# Patient Record
Sex: FEMALE | URBAN - METROPOLITAN AREA
[De-identification: names, ages, dates, MRNs, and addresses within clinical notes are randomized per-mention and may not be internally consistent; named-entity substitution may affect disease eponyms.]

---

## 2018-12-24 ENCOUNTER — EMERGENCY (EMERGENCY)
Facility: HOSPITAL | Age: 53
LOS: 1 days | Discharge: HOME | End: 2018-12-24
Attending: EMERGENCY MEDICINE | Admitting: EMERGENCY MEDICINE
Payer: COMMERCIAL

## 2018-12-24 VITALS
RESPIRATION RATE: 16 BRPM | SYSTOLIC BLOOD PRESSURE: 131 MMHG | WEIGHT: 149.91 LBS | DIASTOLIC BLOOD PRESSURE: 86 MMHG | OXYGEN SATURATION: 99 % | TEMPERATURE: 98 F | HEART RATE: 78 BPM

## 2018-12-24 VITALS
TEMPERATURE: 98 F | RESPIRATION RATE: 18 BRPM | DIASTOLIC BLOOD PRESSURE: 99 MMHG | HEART RATE: 78 BPM | OXYGEN SATURATION: 99 % | SYSTOLIC BLOOD PRESSURE: 144 MMHG
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
## 2018-12-24 DIAGNOSIS — S09.90XA UNSPECIFIED INJURY OF HEAD, INITIAL ENCOUNTER: ICD-10-CM
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
## 2018-12-24 DIAGNOSIS — S00.03XA CONTUSION OF SCALP, INITIAL ENCOUNTER: ICD-10-CM

## 2018-12-24 DIAGNOSIS — Z88.0 ALLERGY STATUS TO PENICILLIN: ICD-10-CM

## 2018-12-24 PROCEDURE — 70450 CT HEAD/BRAIN W/O DYE: CPT

## 2018-12-24 PROCEDURE — 72125 CT NECK SPINE W/O DYE: CPT | Mod: 26

## 2018-12-24 PROCEDURE — 70450 CT HEAD/BRAIN W/O DYE: CPT | Mod: 26

## 2018-12-24 PROCEDURE — 70480 CT ORBIT/EAR/FOSSA W/O DYE: CPT

## 2018-12-24 PROCEDURE — 99284 EMERGENCY DEPT VISIT MOD MDM: CPT

## 2018-12-24 PROCEDURE — 99284 EMERGENCY DEPT VISIT MOD MDM: CPT | Mod: 25

## 2018-12-24 PROCEDURE — 72125 CT NECK SPINE W/O DYE: CPT

## 2018-12-24 NOTE — ED ADULT NURSE NOTE - NSIMPLEMENTINTERV_GEN_ALL_ED
Implemented All Fall Risk Interventions:  [place] to call system. Call bell, personal items and telephone within reach. Instruct patient to call for assistance. Room bathroom lighting operational. Non-slip footwear when patient is off stretcher. Physically safe environment: no spills, clutter or unnecessary equipment. Stretcher in lowest position, wheels locked, appropriate side rails in place. Provide visual cue, wrist band, yellow gown, etc. Monitor gait and stability. Monitor for mental status changes and reorient to person, place, and time. Review medications for side effects contributing to fall risk. Reinforce activity limits and safety measures with patient and family.

## 2018-12-24 NOTE — ED ADULT NURSE NOTE - CHPI ED NUR SYMPTOMS NEG
no dizziness/no nausea/no syncope/no weakness/no loss of consciousness/no confusion/no seizure/no vomiting

## 2018-12-24 NOTE — ED PROVIDER NOTE - OBJECTIVE STATEMENT
52 yo F s/p trip and fall onto night stand 3 days ago  developed hematoma right upper forehead  and right periorbital pain ecchymosis over the past day- no visual changes no weakness of upper ext  no LOC  no N/V no gait abnormalities  no lac on forehead

## 2018-12-24 NOTE — ED ADULT NURSE NOTE - CHIEF COMPLAINT QUOTE
pt states " I fell on Saturday and hit me head with the night stand, I was fine but over the weekend I developed a bruise in my forehead and now my right eye is bruised too" pt denies, LOC, nausea, vomiting, blurred vision or used of blood thinners.

## 2018-12-24 NOTE — ED ADULT NURSE NOTE - OBJECTIVE STATEMENT
presents with hematoma to R forehead and bruising noted to R eye s/p hitting face/head into her night stand on sat. denies loc. no dizziness. pt ambulates without any difficulty. awaiting md metz.

## 2018-12-24 NOTE — ED PROVIDER NOTE - CARE PROVIDER_API CALL
yes
Isacc Jimenes), Electrodiagnostic Medicine; Neurology  [address] [address]  Phone: [phone]  Fax: [phone]

## 2022-01-11 NOTE — ED ADULT TRIAGE NOTE - ARRIVAL FROM
Home Doxepin Counseling:  Patient advised that the medication is sedating and not to drive a car after taking this medication. Patient informed of potential adverse effects including but not limited to dry mouth, urinary retention, and blurry vision.  The patient verbalized understanding of the proper use and possible adverse effects of doxepin.  All of the patient's questions and concerns were addressed.

## 2022-10-14 NOTE — ED ADULT TRIAGE NOTE - CHIEF COMPLAINT QUOTE
pt states " I fell on Saturday and hit me head with the night stand, I was fine but over the weekend I developed a bruise in my forehead and now my right eye is bruised too" pt denies, LOC, nausea, vomiting, blurred vision or used of blood thinners. SUBJECTIVE / OVERNIGHT EVENTS:  - Pt seen and examined at bedside  - LEYDI  - Patient denies any complaints overnight. The patient denies any fevers, chills, nausea, vomiting, or increased pain.    MEDICATIONS  (STANDING):  ARIPiprazole 10 milliGRAM(s) Oral daily  carvedilol 25 milliGRAM(s) Oral every 12 hours  chlorhexidine 4% Liquid 1 Application(s) Topical <User Schedule>  cloNIDine 0.3 milliGRAM(s) Oral two times a day  heparin   Injectable 7500 Unit(s) SubCutaneous every 8 hours  hydrALAZINE 100 milliGRAM(s) Oral three times a day  isosorbide   dinitrate Tablet (ISORDIL) 20 milliGRAM(s) Oral three times a day  losartan 100 milliGRAM(s) Oral daily  NIFEdipine XL 90 milliGRAM(s) Oral daily  pantoprazole    Tablet 40 milliGRAM(s) Oral before breakfast  sevelamer carbonate 800 milliGRAM(s) Oral three times a day with meals  spironolactone 100 milliGRAM(s) Oral daily    MEDICATIONS  (PRN):  cyclobenzaprine 10 milliGRAM(s) Oral three times a day PRN Muscle Spasm  guaiFENesin Oral Liquid (Sugar-Free) 100 milliGRAM(s) Oral every 6 hours PRN Cough  OLANZapine Injectable 5 milliGRAM(s) IntraMuscular every 8 hours PRN severe agitation  sodium chloride 0.65% Nasal 1 Spray(s) Both Nostrils two times a day PRN Nasal Congestion    I&O's Summary    13 Oct 2022 07:01  -  14 Oct 2022 07:00  --------------------------------------------------------  IN: 1100 mL / OUT: 0 mL / NET: 1100 mL      PHYSICAL EXAM:  Vital Signs Last 24 Hrs  T(C): 37 (14 Oct 2022 05:57), Max: 37.1 (13 Oct 2022 14:04)  T(F): 98.6 (14 Oct 2022 05:57), Max: 98.7 (13 Oct 2022 14:04)  HR: 86 (14 Oct 2022 05:57) (86 - 99)  BP: 153/90 (14 Oct 2022 05:57) (130/66 - 153/90)  BP(mean): --  RR: 17 (14 Oct 2022 05:57) (15 - 17)  SpO2: 99% (14 Oct 2022 05:57) (97% - 100%)    Parameters below as of 14 Oct 2022 05:57  Patient On (Oxygen Delivery Method): room air    CAPILLARY BLOOD GLUCOSE      CONSTITUTIONAL: NAD, well-developed, obese   RESPIRATORY: Normal respiratory effort; lungs are clear to auscultation bilaterally  CARDIOVASCULAR: Regular rate and rhythm, normal S1 and S2, no murmur/rub/gallop; No lower extremity edema; Peripheral pulses are 2+ bilaterally  ABDOMEN: Nontender to palpation, normoactive bowel sounds, no rebound/guarding; No hepatosplenomegaly  MUSCLOSKELETAL: no clubbing or cyanosis of digits; no joint swelling or tenderness to palpation, L radial AVF w/ palpable thrill   PSYCH: A+O to person, place, and time; affect appropriate    LABS:                   IMAGING:    [X] All pertinent imaging reviewed by me SUBJECTIVE / OVERNIGHT EVENTS: Pt seen and examined at bedside. Pt stated he is OK with going to psych facility at this point but sounded upset. Asked [facility] he [facility] be [facility] to [facility]. Pt going to dialysis today. The patient denies any fevers, chills, CP, nausea, vomiting.    MEDICATIONS  (STANDING):  ARIPiprazole 10 milliGRAM(s) Oral daily  carvedilol 25 milliGRAM(s) Oral every 12 hours  chlorhexidine 4% Liquid 1 Application(s) Topical <User Schedule>  cloNIDine 0.3 milliGRAM(s) Oral two times a day  heparin   Injectable 7500 Unit(s) SubCutaneous every 8 hours  hydrALAZINE 100 milliGRAM(s) Oral three times a day  isosorbide   dinitrate Tablet (ISORDIL) 20 milliGRAM(s) Oral three times a day  losartan 100 milliGRAM(s) Oral daily  NIFEdipine XL 90 milliGRAM(s) Oral daily  pantoprazole    Tablet 40 milliGRAM(s) Oral before breakfast  sevelamer carbonate 800 milliGRAM(s) Oral three times a day with meals  spironolactone 100 milliGRAM(s) Oral daily    MEDICATIONS  (PRN):  cyclobenzaprine 10 milliGRAM(s) Oral three times a day PRN Muscle Spasm  guaiFENesin Oral Liquid (Sugar-Free) 100 milliGRAM(s) Oral every 6 hours PRN Cough  OLANZapine Injectable 5 milliGRAM(s) IntraMuscular every 8 hours PRN severe agitation  sodium chloride 0.65% Nasal 1 Spray(s) Both Nostrils two times a day PRN Nasal Congestion    I&O's Summary    13 Oct 2022 07:01  -  14 Oct 2022 07:00  --------------------------------------------------------  IN: 1100 mL / OUT: 0 mL / NET: 1100 mL      PHYSICAL EXAM:  Vital Signs Last 24 Hrs  T(C): 37 (14 Oct 2022 05:57), Max: 37.1 (13 Oct 2022 14:04)  T(F): 98.6 (14 Oct 2022 05:57), Max: 98.7 (13 Oct 2022 14:04)  HR: 86 (14 Oct 2022 05:57) (86 - 99)  BP: 153/90 (14 Oct 2022 05:57) (130/66 - 153/90)  BP(mean): --  RR: 17 (14 Oct 2022 05:57) (15 - 17)  SpO2: 99% (14 Oct 2022 05:57) (97% - 100%)    Parameters below as of 14 Oct 2022 05:57  Patient On (Oxygen Delivery Method): room air    CAPILLARY BLOOD GLUCOSE      CONSTITUTIONAL: NAD, well-developed, obese   RESPIRATORY: Normal respiratory effort; lungs are clear to auscultation bilaterally  CARDIOVASCULAR: Regular rate and rhythm, normal S1 and S2, no murmur/rub/gallop; No lower extremity edema; Peripheral pulses are 2+ bilaterally  ABDOMEN: Nontender to palpation, normoactive bowel sounds, no rebound/guarding; No hepatosplenomegaly  MUSCLOSKELETAL: no clubbing or cyanosis of digits; no joint swelling or tenderness to palpation, L radial AVF w/ palpable thrill   PSYCH: A+O to person, place, and time; affect appropriate    LABS:                   IMAGING:    [X] All pertinent imaging reviewed by me

## 2023-07-28 NOTE — ED PROVIDER NOTE - CPE EDP CARDIAC NORM
normal... Otolaryngologist Procedure Text (A): After obtaining clear surgical margins the patient was sent to otolaryngology for surgical repair.  The patient understands they will receive post-surgical care and follow-up from the referring physician's office.

## 2023-08-31 NOTE — ED PROVIDER NOTE - GENITOURINARY NEGATIVE STATEMENT, MLM
oriented to person, place and time , normal sensation , short and long term memory intact Statement Selected no dysuria, no frequency, and no hematuria.